# Patient Record
(demographics unavailable — no encounter records)

---

## 2024-10-19 NOTE — DISCUSSION/SUMMARY
[FreeTextEntry1] : 6 YR OLD WITH SORE THROAT T/C TO LAB (RAP NEG) SUPP CARE INCREASE FLUIDS BLAND DIET NOTIFY OFFICE IF S/S PERSIST OR WORSEN [] : The components of the vaccine(s) to be administered today are listed in the plan of care. The disease(s) for which the vaccine(s) are intended to prevent and the risks have been discussed with the caretaker.  The risks are also included in the appropriate vaccination information statements which have been provided to the patient's caregiver.  The caregiver has given consent to vaccinate.

## 2024-10-19 NOTE — PHYSICAL EXAM
[Acute Distress] : no acute distress [Alert] : alert [EOMI] : grossly EOMI [Clear Rhinorrhea] : clear rhinorrhea [Erythematous Oropharynx] : erythematous oropharynx [Supple] : supple [FROM] : full passive range of motion [Clear to Auscultation Bilaterally] : clear to auscultation bilaterally [Regular Rate and Rhythm] : regular rate and rhythm [Normal S1, S2 audible] : normal S1, S2 audible [Murmur] : no murmur [Soft] : soft [Tender] : nontender [Distended] : nondistended [Normal Bowel Sounds] : normal bowel sounds [Hepatosplenomegaly] : no hepatosplenomegaly [No Abnormal Lymph Nodes Palpated] : no abnormal lymph nodes palpated [Moves All Extremities x 4] : moves all extremities x4 [Warm, Well Perfused x4] : warm, well perfused x4 [Capillary Refill <2s] : capillary refill < 2s [Normotonic] : normotonic [NL] : warm, clear [Warm] : warm [Clear] : clear

## 2024-10-19 NOTE — REVIEW OF SYSTEMS
[Nasal Discharge] : nasal discharge [Nasal Congestion] : nasal congestion [Sore Throat] : sore throat [Vomiting] : vomiting [Negative] : Genitourinary

## 2024-10-19 NOTE — HISTORY OF PRESENT ILLNESS
[de-identified] : SORE THROAT/ FEVER  [FreeTextEntry6] : PT C/O SORE THROAT BEGINNING TODAY VOMITTED 1HR AGO PER DAD., GAVE TYNEOL TODAY

## 2024-10-19 NOTE — HISTORY OF PRESENT ILLNESS
[de-identified] : SORE THROAT/ FEVER  [FreeTextEntry6] : PT C/O SORE THROAT BEGINNING TODAY VOMITTED 1HR AGO PER DAD., GAVE TYNEOL TODAY

## 2024-11-24 NOTE — HISTORY OF PRESENT ILLNESS
[Mother] : mother [Eats healthy meals and snacks] : eats healthy meals and snacks [Eats meals with family] : eats meals with family [Normal] : Normal [Yes] : Patient goes to dentist yearly [Playtime (60 min/d)] : playtime 60 min a day [No] : No cigarette smoke exposure

## 2024-11-25 NOTE — HISTORY OF PRESENT ILLNESS
[de-identified] : Sore Throat [FreeTextEntry6] : DEYA GRANDE is a 7 year old female presenting for complaints of sore throat which started last night.  Some coughing  No fever.  Drinking well.

## 2024-11-25 NOTE — PHYSICAL EXAM
[Acute Distress] : no acute distress [Tenderness] : no tenderness [Erythematous Oropharynx] : erythematous oropharynx [NL] : soft, nontender, nondistended, normal bowel sounds, no hepatosplenomegaly [Enlarged] : enlarged [Anterior Cervical] : anterior cervical [Warm] : warm

## 2024-11-25 NOTE — HISTORY OF PRESENT ILLNESS
[de-identified] : Sore Throat [FreeTextEntry6] : DEYA GRANDE is a 7 year old female presenting for complaints of sore throat which started last night.  Some coughing  No fever.  Drinking well.

## 2024-11-25 NOTE — DISCUSSION/SUMMARY
[FreeTextEntry1] : 6 yo here with strep.  7 year girl found to be rapid strep positive. Complete 10 days of antibiotics. Use antipyretics as needed. Can return to school after 2 doses of antibiotics and when fever free for 24 hours.  Supportive care with Tylenol and Motrin PRN and salt water gargles. Change toothbrush 2-3 days. Return for failure to improve or persistent fever of 100.4.

## 2024-11-25 NOTE — DISCUSSION/SUMMARY
[FreeTextEntry1] : 8 yo here with strep.  7 year girl found to be rapid strep positive. Complete 10 days of antibiotics. Use antipyretics as needed. Can return to school after 2 doses of antibiotics and when fever free for 24 hours.  Supportive care with Tylenol and Motrin PRN and salt water gargles. Change toothbrush 2-3 days. Return for failure to improve or persistent fever of 100.4.

## 2025-01-14 NOTE — PAST MEDICAL HISTORY
[At Term] : at term [Normal Vaginal Route] : by normal vaginal route [None] : there were no delivery complications [Age Appropriate] : age appropriate developmental milestones met [Speech Therapy] : speech therapy [de-identified] : Mat hx of gestational diabetes  [FreeTextEntry1] : 7 lbs 12 oz

## 2025-01-14 NOTE — HISTORY OF PRESENT ILLNESS
[Premenarchal] : premenarchal [Headaches] : no headaches [Abdominal Pain] : no abdominal pain [FreeTextEntry2] : Leatha is a 7 year 2 month old female who was referred by her pediatrician for evaluation due to concern for early puberty. Review of her growth chart shows that her height has ranged from 81st-88th percentile from 3-7 years; her weight was 75th-85th percentile form 3-6 years, then increased to just above the 90th percentile at 7 years.   Leatha noticed that she developed pubic hair in 11/2024, Mother noticed body odor and some black heads in 6/2024. Mother unsure if Leatha has breast tissue.

## 2025-01-14 NOTE — DISCUSSION/SUMMARY
[FreeTextEntry1] : Leatha is a 7 year 2 month old female who was referred for evaluation due to concern for early puberty. Review of her growth chart shows that her height has ranged from 81st-88th percentile from 3-7 years; her weight was 75th-85th percentile form 3-6 years, then increased to just above the 90th percentile at 7 years. At my visit, Leatha was at the 87th percentile for height, 92nd percentile for weight and 88th percentile for BMI.  Her exam is significant for and small amount of pubic hair, but Leatha does not have any glandular breast tissue or growth acceleration on her growth chart. Mother does report acne and body odor. I explained that Leatha is not in true puberty, because the first sign of puberty in a female is breast development.  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  I ordered labs to rule out a pathologic cause of hyperandrogenism, such as an androgen secreting mass (testosterone, androstenedione, DHEAS) or nonclassic congenital adrenal hyperplasia (17-hydroxyprogesterone).  There is no need for treatment of premature adrenarche, and it is unlikely to progress rapidly to true central precocious puberty. If lab work is consistent with premature adrenarche, then patients should be observed for any rapid acceleration in linear growth or signs of puberty, such as body hair, acne, or enlargement of genitals until they are of a normal age to enter puberty. If labs are all within normal range for age, then follow up as needed.

## 2025-01-14 NOTE — FAMILY HISTORY
[___ inches] : [unfilled] inches [FreeTextEntry5] : 14.4 yo  [FreeTextEntry4] : MGM 71 inches, MGF 72 inches; PGM 65 inches, PGF 71 inches [FreeTextEntry2] : 3 yo brother

## 2025-01-14 NOTE — CONSULT LETTER
[Dear  ___] : Dear  [unfilled], [Consult Letter:] : I had the pleasure of evaluating your patient, [unfilled]. [( Thank you for referring [unfilled] for consultation for _____ )] : Thank you for referring [unfilled] for consultation for [unfilled] [Please see my note below.] : Please see my note below. [Consult Closing:] : Thank you very much for allowing me to participate in the care of this patient.  If you have any questions, please do not hesitate to contact me. [Sincerely,] : Sincerely, [FreeTextEntry3] : Sharita Swartz DO

## 2025-01-14 NOTE — PHYSICAL EXAM
[Healthy Appearing] : healthy appearing [Well Nourished] : well nourished [Interactive] : interactive [Normal Appearance] : normal appearance [Well formed] : well formed [Normally Set] : normally set [Abdomen Soft] : soft [Abdomen Tenderness] : non-tender [] : no hepatosplenomegaly [2] : was Maurizio stage 2 [Maurizio Stage ___] : the Maurizio stage for breast development was [unfilled] [Normal] : normal  [Goiter] : no goiter

## 2025-05-08 NOTE — HISTORY OF PRESENT ILLNESS
[No Personal or Family History of Easy Bruising, Bleeding, or Issues with General Anesthesia] : No Personal or Family History of easy bruising, bleeding, or issues with general anesthesia [de-identified] : 8 y/o F hx of sleep apnea PSG done with [facility] in Sept (pending results) -- recommended tonsils removed. Snoring with no noted pausing or gasping. Mom reports that she has had multiple strep throat infections since November 2024. Nasal congestion R>L.

## 2025-05-08 NOTE — REASON FOR VISIT
[Initial Evaluation] : an initial evaluation for [Family Member] : family member [Mother] : mother [FreeTextEntry2] : sleep apnea

## 2025-05-08 NOTE — ADDENDUM
[FreeTextEntry1] :   Documented by Williams Kumari acting as scribe for Dr. Blair on 05/08/2025. All Medical record entries made by the Scribe were at my, Dr. Blair, direction and personally dictated by me on 05/08/2025 . I have reviewed the chart and agree that the record accurately reflects my personal performance of the history, physical exam, assessment and plan. I have also personally directed, reviewed, and agreed with the discharge instructions.

## 2025-05-08 NOTE — CONSULT LETTER
[Dear  ___] : Dear  [unfilled], [Consult Letter:] : I had the pleasure of evaluating your patient, [unfilled]. [Please see my note below.] : Please see my note below. [Consult Closing:] : Thank you very much for allowing me to participate in the care of this patient.  If you have any questions, please do not hesitate to contact me. [Sincerely,] : Sincerely, [FreeTextEntry2] : Key Brooks MD  [FreeTextEntry3] : Evin Blair MD, PhD Chief, Division of Laryngology Department of Otolaryngology [facility] Pediatric Otolaryngology, [facility]  of Otolaryngology [facility]

## 2025-05-08 NOTE — PHYSICAL EXAM
[3+] : 3+ [Clear to Auscultation] : lungs were clear to auscultation bilaterally [Normal Gait and Station] : normal gait and station [Normal muscle strength, symmetry and tone of facial, head and neck musculature] : normal muscle strength, symmetry and tone of facial, head and neck musculature [Normal] : no cervical lymphadenopathy [Effusion] : no effusion [Exposed Vessel] : left anterior vessel not exposed [Wheezing] : no wheezing [Increased Work of Breathing] : no increased work of breathing with use of accessory muscles and retractions

## 2025-07-15 NOTE — DISCUSSION/SUMMARY
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 She is a well appearing girl who is at the 85th percentile for height (was 87th %), 92nd percentile for weight and 89th percentile for BMI. Maurizio 2 pubic hair on exam. Labs from 6/2025 showed an elevated androstenedione - consistent with premature adrenarche. Reviewed with mother. Leatha will be 7 yo in 3 months, therefore she should follow up with me as needed.

## 2025-07-15 NOTE — PHYSICAL EXAM
[Healthy Appearing] : healthy appearing [Well Nourished] : well nourished [Interactive] : interactive [Normal Appearance] : normal appearance [Well formed] : well formed [Normally Set] : normally set [Goiter] : no goiter [Abdomen Soft] : soft [Abdomen Tenderness] : non-tender [] : no hepatosplenomegaly [2] : was Maurizio stage 2 [Maurizio Stage ___] : the Maurizio stage for breast development was [unfilled] [Normal] : normal  [FreeTextEntry2] : No axillary hair

## 2025-07-15 NOTE — CONSULT LETTER
[Dear  ___] : Dear  [unfilled], [Courtesy Letter:] : I had the pleasure of seeing your patient, [unfilled], in my office today. [Please see my note below.] : Please see my note below. [Sincerely,] : Sincerely, [FreeTextEntry3] : Sharita Swartz DO

## 2025-07-15 NOTE — HISTORY OF PRESENT ILLNESS
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 She was initially referred in 1/2025 (age 7y2m). Review of her growth chart showed that her height has ranged from 81st-88th percentile from 3-7 years; her weight was 75th-85th percentile form 3-6 years, then increased to just above the 90th percentile at 7 years. Leatha noticed that she developed pubic hair in 11/2024, Mother noticed body odor and some black heads in 6/2024. Mother unsure if Leatha has breast tissue. At my visit, Leatha was at the 87th percentile for height, 92nd percentile for weight and 88th percentile for BMI. Exam significant for Maurizio 2 pubic hair; Maurizio 1 breast tissue. Labs on 6/19/25 showed: androstenedione 22 ng/dL (H); normal: DHEAS, 17OHP and total testosterone. Consistent with premature adrenarche.   Leatha now returns for follow up. No further pubic hair; denies axillary hair and breast tissue. Pimple on chin. Getting her tonsils and adenoids removed on 8/22/25.    [Premenarchal] : premenarchal